# Patient Record
Sex: MALE | Race: WHITE | NOT HISPANIC OR LATINO | Employment: OTHER | URBAN - METROPOLITAN AREA
[De-identification: names, ages, dates, MRNs, and addresses within clinical notes are randomized per-mention and may not be internally consistent; named-entity substitution may affect disease eponyms.]

---

## 2023-06-02 ENCOUNTER — OFFICE VISIT (OUTPATIENT)
Dept: PRIMARY CARE | Facility: CLINIC | Age: 68
End: 2023-06-02
Payer: COMMERCIAL

## 2023-06-02 VITALS
BODY MASS INDEX: 30.76 KG/M2 | HEIGHT: 67 IN | TEMPERATURE: 96 F | WEIGHT: 196 LBS | DIASTOLIC BLOOD PRESSURE: 62 MMHG | HEART RATE: 90 BPM | OXYGEN SATURATION: 97 % | SYSTOLIC BLOOD PRESSURE: 130 MMHG

## 2023-06-02 DIAGNOSIS — S01.01XD LACERATION OF SCALP, SUBSEQUENT ENCOUNTER: Primary | ICD-10-CM

## 2023-06-02 PROCEDURE — 1159F MED LIST DOCD IN RCRD: CPT | Performed by: FAMILY MEDICINE

## 2023-06-02 PROCEDURE — 1160F RVW MEDS BY RX/DR IN RCRD: CPT | Performed by: FAMILY MEDICINE

## 2023-06-02 PROCEDURE — 99213 OFFICE O/P EST LOW 20 MIN: CPT | Performed by: FAMILY MEDICINE

## 2023-06-02 PROCEDURE — 1036F TOBACCO NON-USER: CPT | Performed by: FAMILY MEDICINE

## 2023-06-02 RX ORDER — INDOMETHACIN 50 MG/1
50 CAPSULE ORAL AS NEEDED
COMMUNITY
Start: 2016-09-27 | End: 2023-12-11

## 2023-06-02 RX ORDER — LISINOPRIL 20 MG/1
20 TABLET ORAL
COMMUNITY
Start: 2023-05-23

## 2023-06-02 RX ORDER — PANTOPRAZOLE SODIUM 20 MG/1
TABLET, DELAYED RELEASE ORAL
COMMUNITY
Start: 2017-01-24 | End: 2023-06-02 | Stop reason: ALTCHOICE

## 2023-06-02 RX ORDER — ATORVASTATIN CALCIUM 40 MG/1
40 TABLET, FILM COATED ORAL
COMMUNITY
Start: 2023-05-23

## 2023-06-02 RX ORDER — ASPIRIN 81 MG/1
81 TABLET ORAL
COMMUNITY

## 2023-06-02 RX ORDER — LISINOPRIL 20 MG/1
1 TABLET ORAL DAILY
COMMUNITY
Start: 2016-09-27 | End: 2023-06-02 | Stop reason: ALTCHOICE

## 2023-06-02 RX ORDER — MULTIVIT-MIN/IRON/FOLIC ACID/K 18-600-40
CAPSULE ORAL
COMMUNITY
End: 2023-06-02 | Stop reason: ALTCHOICE

## 2023-06-02 RX ORDER — ASCORBIC ACID 500 MG
500 TABLET ORAL
COMMUNITY

## 2023-06-02 RX ORDER — ATORVASTATIN CALCIUM 40 MG/1
1 TABLET, FILM COATED ORAL DAILY
COMMUNITY
Start: 2016-09-30 | End: 2023-06-02 | Stop reason: ALTCHOICE

## 2023-06-02 ASSESSMENT — PAIN SCALES - GENERAL: PAINLEVEL: 0-NO PAIN

## 2023-06-02 ASSESSMENT — PATIENT HEALTH QUESTIONNAIRE - PHQ9
SUM OF ALL RESPONSES TO PHQ9 QUESTIONS 1 & 2: 0
2. FEELING DOWN, DEPRESSED OR HOPELESS: NOT AT ALL
1. LITTLE INTEREST OR PLEASURE IN DOING THINGS: NOT AT ALL

## 2023-06-02 ASSESSMENT — LIFESTYLE VARIABLES
HOW OFTEN DO YOU HAVE A DRINK CONTAINING ALCOHOL: NEVER
HOW OFTEN DO YOU HAVE SIX OR MORE DRINKS ON ONE OCCASION: NEVER

## 2023-06-02 NOTE — PROGRESS NOTES
Subjective   Patient ID: Yobany Carroll is a 67 y.o. male who presents for Suture / Staple Removal (Patient is here to have staples removed ).  HPI  67-year-old male for ER follow-up.  Laceration on the top of his head while he was fixing his trailer.  2 staples in place.  Placed 7 days ago.  Review of Systems  See HPI  Visit Vitals  /62 (BP Location: Left arm, Patient Position: Sitting, BP Cuff Size: Adult)   Pulse 90   Temp 35.6 °C (96 °F) (Temporal)        Objective   Physical Exam  Vitals reviewed.   Constitutional:       Appearance: Normal appearance.   Cardiovascular:      Rate and Rhythm: Normal rate and regular rhythm.      Heart sounds: Normal heart sounds.   Pulmonary:      Breath sounds: Normal breath sounds.   Skin:     Comments: Laceration on the scalp with 2 staples in place.  Well-healed.  No surrounding erythema.   Neurological:      Mental Status: He is alert.         Assessment/Plan   Problem List Items Addressed This Visit    None  Visit Diagnoses       Laceration of scalp, subsequent encounter    -  Primary          2 staples removed without complication.  Triple antibiotic ointment applied.  Laceration well-healed.

## 2024-10-15 VITALS
HEART RATE: 74 BPM | OXYGEN SATURATION: 92 % | SYSTOLIC BLOOD PRESSURE: 138 MMHG | SYSTOLIC BLOOD PRESSURE: 170 MMHG | DIASTOLIC BLOOD PRESSURE: 80 MMHG | SYSTOLIC BLOOD PRESSURE: 116 MMHG | HEIGHT: 67 IN | RESPIRATION RATE: 20 BRPM | RESPIRATION RATE: 16 BRPM | SYSTOLIC BLOOD PRESSURE: 115 MMHG | OXYGEN SATURATION: 90 % | RESPIRATION RATE: 16 BRPM | HEART RATE: 71 BPM | OXYGEN SATURATION: 94 % | HEART RATE: 71 BPM | DIASTOLIC BLOOD PRESSURE: 71 MMHG | TEMPERATURE: 96.6 F | WEIGHT: 202 LBS | OXYGEN SATURATION: 95 % | HEART RATE: 74 BPM | SYSTOLIC BLOOD PRESSURE: 116 MMHG | DIASTOLIC BLOOD PRESSURE: 72 MMHG | SYSTOLIC BLOOD PRESSURE: 141 MMHG | RESPIRATION RATE: 27 BRPM | TEMPERATURE: 96.6 F | SYSTOLIC BLOOD PRESSURE: 115 MMHG | RESPIRATION RATE: 17 BRPM | SYSTOLIC BLOOD PRESSURE: 157 MMHG | HEART RATE: 78 BPM | HEIGHT: 67 IN | RESPIRATION RATE: 21 BRPM | OXYGEN SATURATION: 98 % | SYSTOLIC BLOOD PRESSURE: 131 MMHG | OXYGEN SATURATION: 95 % | RESPIRATION RATE: 9 BRPM | OXYGEN SATURATION: 97 % | SYSTOLIC BLOOD PRESSURE: 170 MMHG | SYSTOLIC BLOOD PRESSURE: 138 MMHG | DIASTOLIC BLOOD PRESSURE: 86 MMHG | HEART RATE: 85 BPM | RESPIRATION RATE: 21 BRPM | HEART RATE: 95 BPM | SYSTOLIC BLOOD PRESSURE: 149 MMHG | RESPIRATION RATE: 9 BRPM | DIASTOLIC BLOOD PRESSURE: 77 MMHG | DIASTOLIC BLOOD PRESSURE: 80 MMHG | SYSTOLIC BLOOD PRESSURE: 131 MMHG | RESPIRATION RATE: 22 BRPM | SYSTOLIC BLOOD PRESSURE: 157 MMHG | RESPIRATION RATE: 11 BRPM | HEART RATE: 95 BPM | RESPIRATION RATE: 20 BRPM | OXYGEN SATURATION: 90 % | DIASTOLIC BLOOD PRESSURE: 70 MMHG | OXYGEN SATURATION: 98 % | HEART RATE: 85 BPM | HEART RATE: 78 BPM | DIASTOLIC BLOOD PRESSURE: 71 MMHG | HEART RATE: 84 BPM | HEART RATE: 86 BPM | RESPIRATION RATE: 17 BRPM | SYSTOLIC BLOOD PRESSURE: 141 MMHG | RESPIRATION RATE: 22 BRPM | WEIGHT: 202 LBS | HEART RATE: 73 BPM | DIASTOLIC BLOOD PRESSURE: 77 MMHG | HEART RATE: 73 BPM | DIASTOLIC BLOOD PRESSURE: 86 MMHG | OXYGEN SATURATION: 94 % | DIASTOLIC BLOOD PRESSURE: 70 MMHG | DIASTOLIC BLOOD PRESSURE: 90 MMHG | DIASTOLIC BLOOD PRESSURE: 72 MMHG | HEART RATE: 84 BPM | OXYGEN SATURATION: 92 % | DIASTOLIC BLOOD PRESSURE: 82 MMHG | RESPIRATION RATE: 27 BRPM | DIASTOLIC BLOOD PRESSURE: 82 MMHG | SYSTOLIC BLOOD PRESSURE: 149 MMHG | DIASTOLIC BLOOD PRESSURE: 90 MMHG | RESPIRATION RATE: 11 BRPM | OXYGEN SATURATION: 97 % | HEART RATE: 86 BPM

## 2024-10-15 ASSESSMENT — ENCOUNTER SYMPTOMS
HEMOPTYSIS: 0
FEVER: 0
RHINORRHEA: 0
WEIGHT LOSS: 0
SHORTNESS OF BREATH: 0
MYALGIAS: 0
WHEEZING: 0
COUGH: 1
SWEATS: 0
CHILLS: 0
SORE THROAT: 0
HEARTBURN: 0
HEADACHES: 0

## 2024-10-16 ENCOUNTER — HOSPITAL ENCOUNTER (OUTPATIENT)
Dept: RADIOLOGY | Facility: CLINIC | Age: 69
Discharge: HOME | End: 2024-10-16
Payer: COMMERCIAL

## 2024-10-16 ENCOUNTER — OFFICE VISIT (OUTPATIENT)
Dept: PRIMARY CARE | Facility: CLINIC | Age: 69
End: 2024-10-16

## 2024-10-16 VITALS
HEART RATE: 60 BPM | DIASTOLIC BLOOD PRESSURE: 72 MMHG | TEMPERATURE: 97.4 F | BODY MASS INDEX: 31.86 KG/M2 | WEIGHT: 203 LBS | OXYGEN SATURATION: 98 % | HEIGHT: 67 IN | SYSTOLIC BLOOD PRESSURE: 139 MMHG

## 2024-10-16 DIAGNOSIS — R05.3 CHRONIC COUGH: ICD-10-CM

## 2024-10-16 DIAGNOSIS — E78.2 MIXED HYPERLIPIDEMIA: ICD-10-CM

## 2024-10-16 DIAGNOSIS — I10 PRIMARY HYPERTENSION: ICD-10-CM

## 2024-10-16 DIAGNOSIS — R05.3 CHRONIC COUGH: Primary | ICD-10-CM

## 2024-10-16 DIAGNOSIS — K21.9 GASTROESOPHAGEAL REFLUX DISEASE WITHOUT ESOPHAGITIS: ICD-10-CM

## 2024-10-16 PROCEDURE — 3008F BODY MASS INDEX DOCD: CPT | Performed by: FAMILY MEDICINE

## 2024-10-16 PROCEDURE — 90662 IIV NO PRSV INCREASED AG IM: CPT | Performed by: FAMILY MEDICINE

## 2024-10-16 PROCEDURE — 1036F TOBACCO NON-USER: CPT | Performed by: FAMILY MEDICINE

## 2024-10-16 PROCEDURE — 71046 X-RAY EXAM CHEST 2 VIEWS: CPT

## 2024-10-16 PROCEDURE — 71046 X-RAY EXAM CHEST 2 VIEWS: CPT | Performed by: RADIOLOGY

## 2024-10-16 PROCEDURE — 3075F SYST BP GE 130 - 139MM HG: CPT | Performed by: FAMILY MEDICINE

## 2024-10-16 PROCEDURE — 1126F AMNT PAIN NOTED NONE PRSNT: CPT | Performed by: FAMILY MEDICINE

## 2024-10-16 PROCEDURE — 1159F MED LIST DOCD IN RCRD: CPT | Performed by: FAMILY MEDICINE

## 2024-10-16 PROCEDURE — 1160F RVW MEDS BY RX/DR IN RCRD: CPT | Performed by: FAMILY MEDICINE

## 2024-10-16 PROCEDURE — 3078F DIAST BP <80 MM HG: CPT | Performed by: FAMILY MEDICINE

## 2024-10-16 PROCEDURE — 90471 IMMUNIZATION ADMIN: CPT | Performed by: FAMILY MEDICINE

## 2024-10-16 PROCEDURE — 99214 OFFICE O/P EST MOD 30 MIN: CPT | Performed by: FAMILY MEDICINE

## 2024-10-16 ASSESSMENT — ENCOUNTER SYMPTOMS
SWEATS: 0
COUGH: 1
WEIGHT LOSS: 0
RHINORRHEA: 0
SHORTNESS OF BREATH: 0
MYALGIAS: 0
HEADACHES: 0
HEARTBURN: 0
HEMOPTYSIS: 0
FEVER: 0
CHILLS: 0
WHEEZING: 0
SORE THROAT: 0

## 2024-10-16 ASSESSMENT — PAIN SCALES - GENERAL: PAINLEVEL_OUTOF10: 0-NO PAIN

## 2024-10-16 NOTE — PROGRESS NOTES
Subjective   Patient ID: Yobany Carroll is a 69 y.o. male who presents for Cough (Patient is here for dry cough x 1 month ).  Cough  This is a new problem. The current episode started more than 1 month ago. The problem has been unchanged. The problem occurs every few hours. The cough is Non-productive. Pertinent negatives include no chest pain, chills, ear congestion, ear pain, fever, headaches, heartburn, hemoptysis, myalgias, nasal congestion, postnasal drip, rash, rhinorrhea, sore throat, shortness of breath, sweats, weight loss or wheezing. Nothing aggravates the symptoms.       Review of Systems   Constitutional:  Negative for chills, fever and weight loss.   HENT:  Negative for ear pain, postnasal drip, rhinorrhea and sore throat.    Respiratory:  Positive for cough. Negative for hemoptysis, shortness of breath and wheezing.    Cardiovascular:  Negative for chest pain.   Gastrointestinal:  Negative for heartburn.   Musculoskeletal:  Negative for myalgias.   Skin:  Negative for rash.   Neurological:  Negative for headaches.       Visit Vitals  /72 (BP Location: Left arm, Patient Position: Sitting, BP Cuff Size: Adult)   Pulse 60   Temp 36.3 °C (97.4 °F) (Temporal)        Objective   Physical Exam  Constitutional: Alert and in no acute distress. Well developed, well nourished.   Eyes: Pupils were equal in size, round, reactive to light (PERRL) with normal accommodation and extraocular movements intact (EOMI). Ophthalmoscopic examination: Normal.   Ears, Nose, Mouth, and Throat: External inspection of ears and nose: Normal. Otoscopic examination: Normal. Lips, teeth, and gums: Normal. Oropharynx: Normal.   Neck: No neck mass was observed. Supple. Thyroid not enlarged and there were no palpable thyroid nodules.   Cardiovascular: Heart rate and rhythm were normal, normal S1 and S2, no gallops, no murmurs and no pericardial rub. Carotid pulses: Normal with no bruits. Abdominal aorta: Normal. Pedal pulses:  Normal. No peripheral edema.   Pulmonary: No respiratory distress. Clear bilateral breath sounds.   Abdomen: Soft nontender; no abdominal mass palpated. Normal bowel sounds. No organomegaly.   Skin: Normal skin color and pigmentation, normal skin turgor, and no rash.   Psychiatric: Judgment and insight: Intact. Mood and affect: Normal.  Assessment/Plan   Problem List Items Addressed This Visit             ICD-10-CM    Chronic cough - Primary R05.3    Relevant Orders    XR chest 2 views    Mixed hyperlipidemia E78.2    Primary hypertension I10   #1.  No sign of infection today.  Suspect acid reflux.  Chest x-ray ordered.  Will call with results.  May need to discontinue lisinopril.  Patient has never had a side effect of cough in the past.

## 2024-10-21 ENCOUNTER — AMBULATORY SURGICAL CENTER (OUTPATIENT)
Dept: URBAN - METROPOLITAN AREA SURGERY 12 | Facility: SURGERY | Age: 69
End: 2024-10-21
Payer: COMMERCIAL

## 2024-10-21 ENCOUNTER — OFFICE (OUTPATIENT)
Dept: URBAN - METROPOLITAN AREA PATHOLOGY 2 | Facility: PATHOLOGY | Age: 69
End: 2024-10-21
Payer: COMMERCIAL

## 2024-10-21 DIAGNOSIS — K21.9 GASTRO-ESOPHAGEAL REFLUX DISEASE WITHOUT ESOPHAGITIS: ICD-10-CM

## 2024-10-21 DIAGNOSIS — K20.80 OTHER ESOPHAGITIS WITHOUT BLEEDING: ICD-10-CM

## 2024-10-21 DIAGNOSIS — K21.00 GASTRO-ESOPHAGEAL REFLUX DISEASE WITH ESOPHAGITIS, WITHOUT B: ICD-10-CM

## 2024-10-21 DIAGNOSIS — K22.70 BARRETT'S ESOPHAGUS WITHOUT DYSPLASIA: ICD-10-CM

## 2024-10-21 DIAGNOSIS — K29.50 UNSPECIFIED CHRONIC GASTRITIS WITHOUT BLEEDING: ICD-10-CM

## 2024-10-21 DIAGNOSIS — K44.9 DIAPHRAGMATIC HERNIA WITHOUT OBSTRUCTION OR GANGRENE: ICD-10-CM

## 2024-10-21 PROBLEM — K31.89 OTHER DISEASES OF STOMACH AND DUODENUM: Status: ACTIVE | Noted: 2024-10-21

## 2024-10-21 PROBLEM — K20.90 ESOPHAGITIS, UNSPECIFIED WITHOUT BLEEDING: Status: ACTIVE | Noted: 2024-10-21

## 2024-10-21 PROCEDURE — 88342 IMHCHEM/IMCYTCHM 1ST ANTB: CPT | Performed by: PATHOLOGY

## 2024-10-21 PROCEDURE — 88305 TISSUE EXAM BY PATHOLOGIST: CPT | Performed by: PATHOLOGY

## 2024-10-21 PROCEDURE — 43239 EGD BIOPSY SINGLE/MULTIPLE: CPT | Performed by: INTERNAL MEDICINE

## 2024-10-21 PROCEDURE — 88313 SPECIAL STAINS GROUP 2: CPT | Performed by: PATHOLOGY

## 2024-10-21 RX ORDER — PANTOPRAZOLE 40 MG/1
TABLET, DELAYED RELEASE ORAL
Qty: 90 | Refills: 3 | Status: ACTIVE
Start: 2024-10-21

## 2024-11-06 ENCOUNTER — APPOINTMENT (OUTPATIENT)
Dept: PRIMARY CARE | Facility: CLINIC | Age: 69
End: 2024-11-06
Payer: COMMERCIAL

## 2024-11-06 ENCOUNTER — LAB (OUTPATIENT)
Dept: LAB | Facility: LAB | Age: 69
End: 2024-11-06
Payer: COMMERCIAL

## 2024-11-06 VITALS
HEART RATE: 78 BPM | WEIGHT: 202.6 LBS | TEMPERATURE: 97.6 F | DIASTOLIC BLOOD PRESSURE: 72 MMHG | OXYGEN SATURATION: 98 % | HEIGHT: 67 IN | BODY MASS INDEX: 31.8 KG/M2 | SYSTOLIC BLOOD PRESSURE: 138 MMHG

## 2024-11-06 DIAGNOSIS — E78.2 MIXED HYPERLIPIDEMIA: ICD-10-CM

## 2024-11-06 DIAGNOSIS — Z00.00 ROUTINE GENERAL MEDICAL EXAMINATION AT A HEALTH CARE FACILITY: Primary | ICD-10-CM

## 2024-11-06 DIAGNOSIS — Z00.00 ROUTINE GENERAL MEDICAL EXAMINATION AT A HEALTH CARE FACILITY: ICD-10-CM

## 2024-11-06 DIAGNOSIS — K21.9 GASTROESOPHAGEAL REFLUX DISEASE WITHOUT ESOPHAGITIS: ICD-10-CM

## 2024-11-06 DIAGNOSIS — I10 PRIMARY HYPERTENSION: ICD-10-CM

## 2024-11-06 PROBLEM — H93.19 TINNITUS: Status: ACTIVE | Noted: 2024-11-06

## 2024-11-06 PROBLEM — M54.41 ACUTE RIGHT-SIDED LOW BACK PAIN WITH RIGHT-SIDED SCIATICA: Status: ACTIVE | Noted: 2024-11-06

## 2024-11-06 PROBLEM — J01.90 ACUTE SINUSITIS: Status: ACTIVE | Noted: 2024-11-06

## 2024-11-06 PROBLEM — M10.9 ACUTE GOUT OF RIGHT FOOT: Status: ACTIVE | Noted: 2024-11-06

## 2024-11-06 PROBLEM — R10.31 RIGHT INGUINAL PAIN: Status: ACTIVE | Noted: 2024-11-06
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
## 2024-11-06 PROBLEM — G56.01 RIGHT CARPAL TUNNEL SYNDROME: Status: ACTIVE | Noted: 2024-11-06

## 2024-11-06 PROBLEM — H90.3 BILATERAL SENSORINEURAL HEARING LOSS: Status: ACTIVE | Noted: 2024-11-06

## 2024-11-06 LAB
ALBUMIN SERPL BCP-MCNC: 4.5 G/DL (ref 3.4–5)
ALP SERPL-CCNC: 60 U/L (ref 33–136)
ALT SERPL W P-5'-P-CCNC: 23 U/L (ref 10–52)
ANION GAP SERPL CALC-SCNC: 14 MMOL/L (ref 10–20)
APPEARANCE UR: CLEAR
AST SERPL W P-5'-P-CCNC: 20 U/L (ref 9–39)
BASOPHILS # BLD AUTO: 0.05 X10*3/UL (ref 0–0.1)
BASOPHILS NFR BLD AUTO: 0.8 %
BILIRUB SERPL-MCNC: 0.9 MG/DL (ref 0–1.2)
BILIRUB UR STRIP.AUTO-MCNC: NEGATIVE MG/DL
BUN SERPL-MCNC: 16 MG/DL (ref 6–23)
CALCIUM SERPL-MCNC: 9.6 MG/DL (ref 8.6–10.6)
CHLORIDE SERPL-SCNC: 102 MMOL/L (ref 98–107)
CHOLEST SERPL-MCNC: 172 MG/DL (ref 0–199)
CHOLESTEROL/HDL RATIO: 3.2
CO2 SERPL-SCNC: 24 MMOL/L (ref 21–32)
COLOR UR: COLORLESS
CREAT SERPL-MCNC: 1.04 MG/DL (ref 0.5–1.3)
EGFRCR SERPLBLD CKD-EPI 2021: 77 ML/MIN/1.73M*2
EOSINOPHIL # BLD AUTO: 0.2 X10*3/UL (ref 0–0.7)
EOSINOPHIL NFR BLD AUTO: 3 %
ERYTHROCYTE [DISTWIDTH] IN BLOOD BY AUTOMATED COUNT: 13.3 % (ref 11.5–14.5)
GLUCOSE SERPL-MCNC: 94 MG/DL (ref 74–99)
GLUCOSE UR STRIP.AUTO-MCNC: NORMAL MG/DL
HCT VFR BLD AUTO: 43.6 % (ref 41–52)
HDLC SERPL-MCNC: 54.1 MG/DL
HGB BLD-MCNC: 14.6 G/DL (ref 13.5–17.5)
IMM GRANULOCYTES # BLD AUTO: 0.01 X10*3/UL (ref 0–0.7)
IMM GRANULOCYTES NFR BLD AUTO: 0.2 % (ref 0–0.9)
KETONES UR STRIP.AUTO-MCNC: NEGATIVE MG/DL
LDLC SERPL CALC-MCNC: 86 MG/DL
LEUKOCYTE ESTERASE UR QL STRIP.AUTO: NEGATIVE
LYMPHOCYTES # BLD AUTO: 2.05 X10*3/UL (ref 1.2–4.8)
LYMPHOCYTES NFR BLD AUTO: 31.2 %
MCH RBC QN AUTO: 30.4 PG (ref 26–34)
MCHC RBC AUTO-ENTMCNC: 33.5 G/DL (ref 32–36)
MCV RBC AUTO: 91 FL (ref 80–100)
MONOCYTES # BLD AUTO: 0.63 X10*3/UL (ref 0.1–1)
MONOCYTES NFR BLD AUTO: 9.6 %
NEUTROPHILS # BLD AUTO: 3.64 X10*3/UL (ref 1.2–7.7)
NEUTROPHILS NFR BLD AUTO: 55.2 %
NITRITE UR QL STRIP.AUTO: NEGATIVE
NON HDL CHOLESTEROL: 118 MG/DL (ref 0–149)
NRBC BLD-RTO: 0 /100 WBCS (ref 0–0)
PH UR STRIP.AUTO: 6 [PH]
PLATELET # BLD AUTO: 212 X10*3/UL (ref 150–450)
POTASSIUM SERPL-SCNC: 3.9 MMOL/L (ref 3.5–5.3)
PROT SERPL-MCNC: 7.3 G/DL (ref 6.4–8.2)
PROT UR STRIP.AUTO-MCNC: NEGATIVE MG/DL
PSA SERPL-MCNC: 1.86 NG/ML
RBC # BLD AUTO: 4.81 X10*6/UL (ref 4.5–5.9)
RBC # UR STRIP.AUTO: NEGATIVE /UL
SODIUM SERPL-SCNC: 136 MMOL/L (ref 136–145)
SP GR UR STRIP.AUTO: 1.01
TRIGL SERPL-MCNC: 161 MG/DL (ref 0–149)
UROBILINOGEN UR STRIP.AUTO-MCNC: NORMAL MG/DL
VLDL: 32 MG/DL (ref 0–40)
WBC # BLD AUTO: 6.6 X10*3/UL (ref 4.4–11.3)

## 2024-11-06 PROCEDURE — 99397 PER PM REEVAL EST PAT 65+ YR: CPT | Performed by: FAMILY MEDICINE

## 2024-11-06 PROCEDURE — 36415 COLL VENOUS BLD VENIPUNCTURE: CPT

## 2024-11-06 PROCEDURE — 1036F TOBACCO NON-USER: CPT | Performed by: FAMILY MEDICINE

## 2024-11-06 PROCEDURE — 85025 COMPLETE CBC W/AUTO DIFF WBC: CPT

## 2024-11-06 PROCEDURE — 1160F RVW MEDS BY RX/DR IN RCRD: CPT | Performed by: FAMILY MEDICINE

## 2024-11-06 PROCEDURE — 1126F AMNT PAIN NOTED NONE PRSNT: CPT | Performed by: FAMILY MEDICINE

## 2024-11-06 PROCEDURE — 81003 URINALYSIS AUTO W/O SCOPE: CPT

## 2024-11-06 PROCEDURE — 80053 COMPREHEN METABOLIC PANEL: CPT

## 2024-11-06 PROCEDURE — 3075F SYST BP GE 130 - 139MM HG: CPT | Performed by: FAMILY MEDICINE

## 2024-11-06 PROCEDURE — 84153 ASSAY OF PSA TOTAL: CPT

## 2024-11-06 PROCEDURE — 3078F DIAST BP <80 MM HG: CPT | Performed by: FAMILY MEDICINE

## 2024-11-06 PROCEDURE — 83825 ASSAY OF MERCURY: CPT

## 2024-11-06 PROCEDURE — 1159F MED LIST DOCD IN RCRD: CPT | Performed by: FAMILY MEDICINE

## 2024-11-06 PROCEDURE — 80061 LIPID PANEL: CPT

## 2024-11-06 PROCEDURE — 3008F BODY MASS INDEX DOCD: CPT | Performed by: FAMILY MEDICINE

## 2024-11-06 RX ORDER — PANTOPRAZOLE SODIUM 40 MG/1
TABLET, DELAYED RELEASE ORAL
COMMUNITY
Start: 2024-10-21

## 2024-11-06 ASSESSMENT — PAIN SCALES - GENERAL: PAINLEVEL_OUTOF10: 0-NO PAIN

## 2024-11-06 ASSESSMENT — ENCOUNTER SYMPTOMS
DEPRESSION: 0
LOSS OF SENSATION IN FEET: 0
OCCASIONAL FEELINGS OF UNSTEADINESS: 0

## 2024-11-06 NOTE — PROGRESS NOTES
Subjective   Patient ID: Yobany Carroll is a 69 y.o. male who presents for Annual Exam (Annual cpe fasting bw).  HPI  69-year-old male presents for complete physical exam.  No acute complaints today.  His cough is improved with the use of a proton pump inhibitor.  Review of Systems  Constitutional: no chills, no fever and no night sweats.   Eyes: no blurred vision and no eyesight problems.   ENT: no hearing loss, no nasal congestion, no nasal discharge, no hoarseness and no sore throat.   Cardiovascular: no chest pain, no intermittent leg claudication, no lower extremity edema, no palpitations and no syncope.   Respiratory: no cough, no shortness of breath during exertion, no shortness of breath at rest and no wheezing.   Gastrointestinal: no abdominal pain, no blood in stools, no constipation, no diarrhea, no melena, no nausea, no rectal pain and no vomiting.   Genitourinary: no dysuria, no change in urinary frequency, no urinary hesitancy and no feelings of urinary urgency.   Neurological: no difficulty walking, no headache, no limb weakness, no numbness and no tingling.   Psychiatric: no anxiety, no depression, no anhedonia and no substance use disorders.   Endocrine: no recent weight gain and no recent weight loss.   Hematologic/Lymphatic: no tendency for easy bruising and no swollen glands.  Visit Vitals  /72 (BP Location: Left arm)   Pulse 78   Temp 36.4 °C (97.6 °F) (Temporal)        Objective   Physical Exam  Constitutional: Alert and in no acute distress. Well developed, well nourished.   Eyes: Pupils were equal in size, round, reactive to light (PERRL) with normal accommodation and extraocular movements intact (EOMI). Ophthalmoscopic examination: Normal.   Ears, Nose, Mouth, and Throat: External inspection of ears and nose: Normal. Otoscopic examination: Normal. Lips, teeth, and gums: Normal. Oropharynx: Normal.   Neck: No neck mass was observed. Supple. Thyroid not enlarged and there were no palpable  thyroid nodules.   Cardiovascular: Heart rate and rhythm were normal, normal S1 and S2, no gallops, no murmurs and no pericardial rub. Carotid pulses: Normal with no bruits. Abdominal aorta: Normal. Pedal pulses: Normal. No peripheral edema.   Pulmonary: No respiratory distress. Clear bilateral breath sounds.   Abdomen: Soft nontender; no abdominal mass palpated. Normal bowel sounds. No organomegaly.   Skin: Normal skin color and pigmentation, normal skin turgor, and no rash.   Psychiatric: Judgment and insight: Intact. Mood and affect: Normal.  Assessment/Plan   Problem List Items Addressed This Visit             ICD-10-CM    Mixed hyperlipidemia E78.2    Primary hypertension I10    Gastroesophageal reflux disease without esophagitis K21.9    Routine general medical examination at a health care facility - Primary Z00.00    Relevant Orders    Lipid Panel    Comprehensive Metabolic Panel    CBC and Auto Differential    Urinalysis with Reflex Microscopic    Prostate Spec.Ag,Screen    [place], Blood

## 2024-11-08 LAB — MERCURY BLD-MCNC: 3.6 UG/L

## 2024-11-18 VITALS
SYSTOLIC BLOOD PRESSURE: 103 MMHG | HEART RATE: 73 BPM | DIASTOLIC BLOOD PRESSURE: 103 MMHG | DIASTOLIC BLOOD PRESSURE: 78 MMHG | DIASTOLIC BLOOD PRESSURE: 65 MMHG | SYSTOLIC BLOOD PRESSURE: 131 MMHG | RESPIRATION RATE: 22 BRPM | RESPIRATION RATE: 26 BRPM | SYSTOLIC BLOOD PRESSURE: 139 MMHG | DIASTOLIC BLOOD PRESSURE: 87 MMHG | DIASTOLIC BLOOD PRESSURE: 89 MMHG | HEART RATE: 80 BPM | HEART RATE: 77 BPM | HEART RATE: 90 BPM | HEIGHT: 67 IN | WEIGHT: 202 LBS | SYSTOLIC BLOOD PRESSURE: 111 MMHG | DIASTOLIC BLOOD PRESSURE: 75 MMHG | HEART RATE: 83 BPM | TEMPERATURE: 97.9 F | RESPIRATION RATE: 23 BRPM | HEART RATE: 85 BPM | SYSTOLIC BLOOD PRESSURE: 154 MMHG | RESPIRATION RATE: 18 BRPM | DIASTOLIC BLOOD PRESSURE: 84 MMHG | SYSTOLIC BLOOD PRESSURE: 119 MMHG | OXYGEN SATURATION: 93 % | RESPIRATION RATE: 21 BRPM | RESPIRATION RATE: 24 BRPM | DIASTOLIC BLOOD PRESSURE: 70 MMHG | SYSTOLIC BLOOD PRESSURE: 110 MMHG | HEART RATE: 75 BPM | RESPIRATION RATE: 12 BRPM | HEART RATE: 81 BPM | OXYGEN SATURATION: 94 % | RESPIRATION RATE: 19 BRPM | DIASTOLIC BLOOD PRESSURE: 71 MMHG | HEART RATE: 87 BPM | SYSTOLIC BLOOD PRESSURE: 149 MMHG | SYSTOLIC BLOOD PRESSURE: 160 MMHG | SYSTOLIC BLOOD PRESSURE: 108 MMHG | SYSTOLIC BLOOD PRESSURE: 97 MMHG | HEART RATE: 78 BPM | HEART RATE: 84 BPM | OXYGEN SATURATION: 96 % | OXYGEN SATURATION: 95 % | DIASTOLIC BLOOD PRESSURE: 82 MMHG | HEART RATE: 74 BPM | RESPIRATION RATE: 27 BRPM | RESPIRATION RATE: 15 BRPM | DIASTOLIC BLOOD PRESSURE: 67 MMHG | SYSTOLIC BLOOD PRESSURE: 107 MMHG | HEART RATE: 88 BPM | SYSTOLIC BLOOD PRESSURE: 116 MMHG | RESPIRATION RATE: 17 BRPM | RESPIRATION RATE: 25 BRPM

## 2024-11-25 ENCOUNTER — AMBULATORY SURGICAL CENTER (OUTPATIENT)
Dept: URBAN - METROPOLITAN AREA SURGERY 12 | Facility: SURGERY | Age: 69
End: 2024-11-25

## 2024-11-25 DIAGNOSIS — K64.8 OTHER HEMORRHOIDS: ICD-10-CM

## 2024-11-25 DIAGNOSIS — K64.4 RESIDUAL HEMORRHOIDAL SKIN TAGS: ICD-10-CM

## 2024-11-25 DIAGNOSIS — Z86.0101 PERSONAL HISTORY OF ADENOMATOUS AND SERRATED COLON POLYPS: ICD-10-CM

## 2024-11-25 DIAGNOSIS — Z83.719 FAMILY HISTORY OF COLON POLYPS, UNSPECIFIED: ICD-10-CM

## 2024-11-25 DIAGNOSIS — Z09 ENCOUNTER FOR FOLLOW-UP EXAMINATION AFTER COMPLETED TREATMEN: ICD-10-CM

## 2024-11-25 DIAGNOSIS — K57.30 DIVERTICULOSIS OF LARGE INTESTINE WITHOUT PERFORATION OR ABS: ICD-10-CM

## 2024-11-25 PROBLEM — Z83.71 FAMILY HISTORY OF COLONIC POLYPS: Status: ACTIVE | Noted: 2024-11-25

## 2024-11-25 PROBLEM — Z86.010 PERSONAL HISTORY OF COLON POLYPS: Status: ACTIVE | Noted: 2024-11-25

## 2025-03-07 ENCOUNTER — OFFICE VISIT (OUTPATIENT)
Dept: PRIMARY CARE | Facility: CLINIC | Age: 70
End: 2025-03-07
Payer: COMMERCIAL

## 2025-03-07 VITALS
DIASTOLIC BLOOD PRESSURE: 97 MMHG | WEIGHT: 214 LBS | BODY MASS INDEX: 33.59 KG/M2 | HEART RATE: 80 BPM | HEIGHT: 67 IN | SYSTOLIC BLOOD PRESSURE: 146 MMHG

## 2025-03-07 DIAGNOSIS — I10 PRIMARY HYPERTENSION: ICD-10-CM

## 2025-03-07 DIAGNOSIS — R55 NEAR SYNCOPE: Primary | ICD-10-CM

## 2025-03-07 DIAGNOSIS — L30.8 OTHER ECZEMA: ICD-10-CM

## 2025-03-07 PROBLEM — L30.9 ECZEMA: Status: ACTIVE | Noted: 2025-03-07

## 2025-03-07 PROCEDURE — 1160F RVW MEDS BY RX/DR IN RCRD: CPT | Performed by: FAMILY MEDICINE

## 2025-03-07 PROCEDURE — 1036F TOBACCO NON-USER: CPT | Performed by: FAMILY MEDICINE

## 2025-03-07 PROCEDURE — 99214 OFFICE O/P EST MOD 30 MIN: CPT | Performed by: FAMILY MEDICINE

## 2025-03-07 PROCEDURE — 3080F DIAST BP >= 90 MM HG: CPT | Performed by: FAMILY MEDICINE

## 2025-03-07 PROCEDURE — 3008F BODY MASS INDEX DOCD: CPT | Performed by: FAMILY MEDICINE

## 2025-03-07 PROCEDURE — 3077F SYST BP >= 140 MM HG: CPT | Performed by: FAMILY MEDICINE

## 2025-03-07 PROCEDURE — 1159F MED LIST DOCD IN RCRD: CPT | Performed by: FAMILY MEDICINE

## 2025-03-07 PROCEDURE — 93000 ELECTROCARDIOGRAM COMPLETE: CPT | Performed by: FAMILY MEDICINE

## 2025-03-07 ASSESSMENT — PATIENT HEALTH QUESTIONNAIRE - PHQ9
SUM OF ALL RESPONSES TO PHQ9 QUESTIONS 1 AND 2: 0
1. LITTLE INTEREST OR PLEASURE IN DOING THINGS: NOT AT ALL
2. FEELING DOWN, DEPRESSED OR HOPELESS: NOT AT ALL

## 2025-03-07 ASSESSMENT — ENCOUNTER SYMPTOMS
PALPITATIONS: 0
LIGHT-HEADEDNESS: 1

## 2025-03-07 NOTE — PROGRESS NOTES
Subjective   Patient ID: Yobany Carroll is a 69 y.o. male who presents for Ear Fullness (Ear crusty).  HPI  69-year-old male presents with separate complaints.  His initial complaint is dry skin out of his ears.  He is also complaining of episodes of near syncope lately.  He thinks it may be due to not drinking enough water.  Review of Systems   Cardiovascular:  Negative for chest pain, palpitations and leg swelling.   Neurological:  Positive for light-headedness.   All other systems reviewed and are negative.      Visit Vitals  BP (!) 146/97   Pulse 80        Objective   Physical Exam  Constitutional: Alert and in no acute distress. Well developed, well nourished.   Eyes: Pupils were equal in size, round, reactive to light (PERRL) with normal accommodation and extraocular movements intact (EOMI). Ophthalmoscopic examination: Normal.   Ears, Nose, Mouth, and Throat: External inspection of ears and nose: Normal. Otoscopic examination: Normal. Lips, teeth, and gums: Normal. Oropharynx: Normal.   Neck: No neck mass was observed. Supple. Thyroid not enlarged and there were no palpable thyroid nodules.   Cardiovascular: Heart rate and rhythm were normal, normal S1 and S2, no gallops, no murmurs and no pericardial rub. Carotid pulses: Normal with no bruits. Abdominal aorta: Normal. Pedal pulses: Normal. No peripheral edema.   Pulmonary: No respiratory distress. Clear bilateral breath sounds.   Abdomen: Soft nontender; no abdominal mass palpated. Normal bowel sounds. No organomegaly.   Skin: Normal skin color and pigmentation, normal skin turgor, and no rash.   Psychiatric: Judgment and insight: Intact. Mood and affect: Normal.  Assessment/Plan   Problem List Items Addressed This Visit             ICD-10-CM    Primary hypertension I10    Near syncope - Primary R55    Relevant Orders    ECG 12 lead (Clinic Performed) (Completed)    Eczema L30.9   #1.  In office EKG shows normal sinus rhythm.  Continue to hydrate properly.  If  symptoms continue he should follow-up with cardiology or follow-up in our office.  2.  Aquaphor for dry skin of the ears.  3.  Blood pressure elevated today.  Advised home blood pressure monitoring.  He should call if his blood pressures remain elevated.

## 2025-04-25 ENCOUNTER — OFFICE VISIT (OUTPATIENT)
Dept: PRIMARY CARE | Facility: CLINIC | Age: 70
End: 2025-04-25
Payer: COMMERCIAL

## 2025-04-25 VITALS
DIASTOLIC BLOOD PRESSURE: 80 MMHG | BODY MASS INDEX: 32.8 KG/M2 | HEART RATE: 82 BPM | WEIGHT: 209 LBS | HEIGHT: 67 IN | OXYGEN SATURATION: 96 % | SYSTOLIC BLOOD PRESSURE: 138 MMHG | TEMPERATURE: 96.4 F

## 2025-04-25 DIAGNOSIS — I10 PRIMARY HYPERTENSION: ICD-10-CM

## 2025-04-25 DIAGNOSIS — N52.9 ERECTILE DYSFUNCTION, UNSPECIFIED ERECTILE DYSFUNCTION TYPE: ICD-10-CM

## 2025-04-25 DIAGNOSIS — L20.82 FLEXURAL ECZEMA: ICD-10-CM

## 2025-04-25 DIAGNOSIS — E04.1 THYROID NODULE: Primary | ICD-10-CM

## 2025-04-25 PROCEDURE — 1159F MED LIST DOCD IN RCRD: CPT | Performed by: FAMILY MEDICINE

## 2025-04-25 PROCEDURE — 99214 OFFICE O/P EST MOD 30 MIN: CPT | Performed by: FAMILY MEDICINE

## 2025-04-25 PROCEDURE — 3075F SYST BP GE 130 - 139MM HG: CPT | Performed by: FAMILY MEDICINE

## 2025-04-25 PROCEDURE — 1160F RVW MEDS BY RX/DR IN RCRD: CPT | Performed by: FAMILY MEDICINE

## 2025-04-25 PROCEDURE — 3008F BODY MASS INDEX DOCD: CPT | Performed by: FAMILY MEDICINE

## 2025-04-25 PROCEDURE — 3079F DIAST BP 80-89 MM HG: CPT | Performed by: FAMILY MEDICINE

## 2025-04-25 PROCEDURE — 1036F TOBACCO NON-USER: CPT | Performed by: FAMILY MEDICINE

## 2025-04-25 PROCEDURE — 1126F AMNT PAIN NOTED NONE PRSNT: CPT | Performed by: FAMILY MEDICINE

## 2025-04-25 RX ORDER — SILDENAFIL 50 MG/1
50 TABLET, FILM COATED ORAL DAILY PRN
Qty: 12 TABLET | Refills: 3 | Status: SHIPPED | OUTPATIENT
Start: 2025-04-25 | End: 2026-04-25

## 2025-04-25 ASSESSMENT — PAIN SCALES - GENERAL: PAINLEVEL_OUTOF10: 0-NO PAIN

## 2025-04-25 ASSESSMENT — PATIENT HEALTH QUESTIONNAIRE - PHQ9
2. FEELING DOWN, DEPRESSED OR HOPELESS: NOT AT ALL
1. LITTLE INTEREST OR PLEASURE IN DOING THINGS: NOT AT ALL
SUM OF ALL RESPONSES TO PHQ9 QUESTIONS 1 AND 2: 0

## 2025-04-25 ASSESSMENT — ENCOUNTER SYMPTOMS
FEVER: 0
CHILLS: 0

## 2025-04-25 NOTE — PROGRESS NOTES
Subjective   Patient ID: Yobany Carroll is a 69 y.o. male who presents for Follow-up (Patient is here for follow up on thyroid. ).  HPI  69-year-old male for ED follow-up for a fall with a broken scapula.  Incidental finding of pulmonary nodule on CT of the chest.  Ultrasound confirmed TI-RADS 3 nodule.  Follow-up recommended in 1 year.  Thyroid blood work normal.  Review of Systems   Constitutional:  Negative for chills and fever.   All other systems reviewed and are negative.      Visit Vitals  /80 (BP Location: Left arm, Patient Position: Sitting, BP Cuff Size: Adult)   Pulse 82   Temp 35.8 °C (96.4 °F) (Temporal)        Objective   Physical Exam  Vitals reviewed.   Constitutional:       Appearance: Normal appearance.   Cardiovascular:      Rate and Rhythm: Normal rate and regular rhythm.      Heart sounds: Normal heart sounds.   Pulmonary:      Breath sounds: Normal breath sounds.   Musculoskeletal:      Cervical back: Normal range of motion.   Skin:     Findings: No rash.   Neurological:      Mental Status: He is alert.   Psychiatric:         Mood and Affect: Mood normal.         Behavior: Behavior normal.         Assessment/Plan   Problem List Items Addressed This Visit           ICD-10-CM    Primary hypertension I10    Eczema L30.9    Thyroid nodule - Primary E04.1     Other Visit Diagnoses         Codes      Erectile dysfunction, unspecified erectile dysfunction type     N52.9    Relevant Medications    sildenafil (Viagra) 50 mg tablet

## 2025-06-30 DIAGNOSIS — M10.9 ACUTE GOUT INVOLVING TOE, UNSPECIFIED CAUSE, UNSPECIFIED LATERALITY: ICD-10-CM

## 2025-06-30 RX ORDER — INDOMETHACIN 50 MG/1
CAPSULE ORAL
Qty: 21 CAPSULE | Refills: 5 | Status: SHIPPED | OUTPATIENT
Start: 2025-06-30